# Patient Record
Sex: FEMALE | Race: WHITE
[De-identification: names, ages, dates, MRNs, and addresses within clinical notes are randomized per-mention and may not be internally consistent; named-entity substitution may affect disease eponyms.]

---

## 2019-11-20 ENCOUNTER — HOSPITAL ENCOUNTER (EMERGENCY)
Dept: HOSPITAL 25 - UCEAST | Age: 29
Discharge: HOME | End: 2019-11-20
Payer: COMMERCIAL

## 2019-11-20 VITALS — DIASTOLIC BLOOD PRESSURE: 68 MMHG | SYSTOLIC BLOOD PRESSURE: 124 MMHG

## 2019-11-20 DIAGNOSIS — M54.89: ICD-10-CM

## 2019-11-20 DIAGNOSIS — R35.0: Primary | ICD-10-CM

## 2019-11-20 PROCEDURE — 87491 CHLMYD TRACH DNA AMP PROBE: CPT

## 2019-11-20 PROCEDURE — 99211 OFF/OP EST MAY X REQ PHY/QHP: CPT

## 2019-11-20 PROCEDURE — 81003 URINALYSIS AUTO W/O SCOPE: CPT

## 2019-11-20 PROCEDURE — 87591 N.GONORRHOEAE DNA AMP PROB: CPT

## 2019-11-20 PROCEDURE — 87086 URINE CULTURE/COLONY COUNT: CPT

## 2019-11-20 PROCEDURE — G0463 HOSPITAL OUTPT CLINIC VISIT: HCPCS

## 2019-11-20 NOTE — UC
Complaint Female HPI





- HPI Summary


HPI Summary: 





30 yo female has had > one week hx of right sided back pain and increased 

frequency of urination


no dysuria


no f/v


no n/v/d


no abd or pelvic pain


no vaginal d/c or itch


yesterday saw flecks of blood in her urine











- History Of Current Complaint


Chief Complaint: UCGU


Stated Complaint: URINARY ISSUE PAIN ON SIDE


Time Seen by Provider: 11/20/19 07:09


Hx Obtained From: Patient


Hx Last Menstrual Period: 11/15/19


Onset/Duration: Gradual Onset, Lasting Days


Timing: Constant


Severity Initially: Mild


Severity Currently: Mild


Pain Intensity: 4


Pain Scale Used: 0-10 Numeric


Character: Not Applicable


Aggravating Factor(s): Nothing


Alleviating Factor(s): Nothing


Associated Signs And Symptoms: Positive: Back Pain.  Negative: Fever, Vaginal 

Bleeding/Discharge, Vaginal Discharge, Nausea, Vomiting(# Of Episodes =), 

Genital Swelling, Genital Blisters, Retained Foregin Body (Specify)


Female Torso: 


  __________________________














  __________________________





 1 - tender








- Allergies/Home Medications


Allergies/Adverse Reactions: 


 Allergies











Allergy/AdvReac Type Severity Reaction Status Date / Time


 


prednisone Allergy  sleepiness Verified 11/20/19 07:19











Home Medications: 


 Home Medications





NK [No Home Medications Reported]  11/20/19 [History Confirmed 11/20/19]











PMH/Surg Hx/FS Hx/Imm Hx


Previously Healthy: Yes





- Surgical History


Surgical History: Yes


Surgery Procedure, Year, and Place: appy





- Family History


Known Family History: 


   Negative: Cardiac Disease, Hypertension, Diabetes, Renal Disease





- Social History


Alcohol Use: Occasionally


Substance Use Type: None


Smoking Status (MU): Never Smoked Tobacco





Review of Systems


All Other Systems Reviewed And Are Negative: Yes


Constitutional: Positive: Negative


Skin: Positive: Negative


Eyes: Positive: Negative


ENT: Positive: Negative


Respiratory: Positive: Negative


Cardiovascular: Positive: Negative


Gastrointestinal: Positive: Negative


Genitourinary: Positive: Hematuria - ??, Frequency.  Negative: Dysuria, Urgency


Motor: Positive: Negative


Neurovascular: Positive: Negative


Musculoskeletal: Positive: Negative


Neurological: Positive: Negative


Psychological: Positive: Negative





Physical Exam


Triage Information Reviewed: Yes


Appearance: Well-Appearing, No Pain Distress, Well-Nourished


Vital Signs: 


 Initial Vital Signs











Temp  97.5 F   11/20/19 07:11


 


Pulse  81   11/20/19 07:11


 


Resp  15   11/20/19 07:11


 


BP  124/68   11/20/19 07:11


 


Pulse Ox  100   11/20/19 07:11











Vital Signs Reviewed: Yes


Eyes: Positive: Conjunctiva Clear


ENT: Positive: Hearing grossly normal, Uvula midline.  Negative: Nasal 

congestion, Nasal drainage, Trismus, Muffled voice, Hoarse voice


Dental Exam: Normal


Neck exam: Normal


Neck: Positive: Supple, Nontender, No Lymphadenopathy


Respiratory: Positive: Lungs clear, Normal breath sounds, No respiratory 

distress, No accessory muscle use


Cardiovascular: Positive: RRR, No Murmur


Abdomen Description: Positive: Nontender, No Organomegaly, Soft, CVA Tenderness 

(R) - mild


Bowel Sounds: Positive: Present


Musculoskeletal: Positive: ROM Intact, No Edema


Neurological: Positive: Alert


Psychological Exam: Normal


Skin Exam: Normal





Diagnostics





- Laboratory


Lab Results: 





UA tr leuks tr rbc





 Complaint Female Dx





- Course


Course Of Treatment: 





due to possible hematuria noted yesterday and right CVA tenderness I suggested 

at CT of abd and pelvis to check for a kidney stone.  She wishes to wait for 

culture results.  Aware of need to recheck for new or worsening symptoms.





- Differential Dx/Diagnosis


Provider Diagnosis: 


 Increased frequency of urination, Right-sided back pain








Discharge ED





- Sign-Out/Discharge


Documenting (check all that apply): Patient Departure


All imaging exams completed and their final reports reviewed: No Studies





- Discharge Plan


Condition: Stable


Disposition: HOME


Patient Education Materials:  Renal Colic (ED)


Referrals: 


Isis Orozco MD [Primary Care Provider] - 2 Days


Additional Instructions: 


the specks you saw in your urine may have been blood





you may have a tiny kidney stone 





TO ER FOR :


increased pain


fever


vomiting





a urine culture is pending although your urine did not look suspicious a UTI





- Billing Disposition and Condition


Condition: STABLE


Disposition: Home